# Patient Record
Sex: MALE | Race: OTHER | HISPANIC OR LATINO | URBAN - METROPOLITAN AREA
[De-identification: names, ages, dates, MRNs, and addresses within clinical notes are randomized per-mention and may not be internally consistent; named-entity substitution may affect disease eponyms.]

---

## 2022-08-23 ENCOUNTER — EMERGENCY (EMERGENCY)
Facility: HOSPITAL | Age: 22
LOS: 1 days | Discharge: HOME | End: 2022-08-23
Attending: EMERGENCY MEDICINE
Payer: MEDICAID

## 2022-08-23 VITALS
RESPIRATION RATE: 16 BRPM | TEMPERATURE: 98 F | HEIGHT: 66 IN | HEART RATE: 69 BPM | DIASTOLIC BLOOD PRESSURE: 82 MMHG | OXYGEN SATURATION: 99 % | WEIGHT: 134.92 LBS | SYSTOLIC BLOOD PRESSURE: 127 MMHG

## 2022-08-23 PROCEDURE — 99283 EMERGENCY DEPT VISIT LOW MDM: CPT | Mod: 25

## 2022-08-23 PROCEDURE — 73130 X-RAY EXAM OF HAND: CPT | Mod: 26,LT

## 2022-08-23 PROCEDURE — 10120 INC&RMVL FB SUBQ TISS SMPL: CPT

## 2022-08-23 PROCEDURE — 90715 TDAP VACCINE 7 YRS/> IM: CPT

## 2022-08-23 PROCEDURE — 99284 EMERGENCY DEPT VISIT MOD MDM: CPT

## 2022-08-23 PROCEDURE — 73130 X-RAY EXAM OF HAND: CPT

## 2022-08-23 RX ORDER — CEPHALEXIN 500 MG
1 CAPSULE ORAL
Qty: 28 | Refills: 0
Start: 2022-08-23 | End: 2022-08-29

## 2022-08-23 RX ORDER — TETANUS TOXOID, REDUCED DIPHTHERIA TOXOID AND ACELLULAR PERTUSSIS VACCINE, ADSORBED 5; 2.5; 8; 8; 2.5 [IU]/.5ML; [IU]/.5ML; UG/.5ML; UG/.5ML; UG/.5ML
0.5 SUSPENSION INTRAMUSCULAR ONCE
Refills: 0 | Status: COMPLETED | OUTPATIENT
Start: 2022-08-23 | End: 2022-08-23

## 2022-08-23 RX ORDER — CEPHALEXIN 500 MG
500 CAPSULE ORAL ONCE
Refills: 0 | Status: COMPLETED | OUTPATIENT
Start: 2022-08-23 | End: 2022-08-23

## 2022-08-23 RX ORDER — IBUPROFEN 200 MG
600 TABLET ORAL ONCE
Refills: 0 | Status: COMPLETED | OUTPATIENT
Start: 2022-08-23 | End: 2022-08-23

## 2022-08-23 RX ADMIN — Medication 500 MILLIGRAM(S): at 22:37

## 2022-08-23 RX ADMIN — TETANUS TOXOID, REDUCED DIPHTHERIA TOXOID AND ACELLULAR PERTUSSIS VACCINE, ADSORBED 0.5 MILLILITER(S): 5; 2.5; 8; 8; 2.5 SUSPENSION INTRAMUSCULAR at 21:17

## 2022-08-23 RX ADMIN — Medication 600 MILLIGRAM(S): at 21:46

## 2022-08-23 RX ADMIN — Medication 600 MILLIGRAM(S): at 21:16

## 2022-08-23 NOTE — ED PROVIDER NOTE - NSFOLLOWUPINSTRUCTIONS_ED_ALL_ED_FT
Please follow up with hand surgery within 3 days     Return to the ER if pain to the site, swelling to the site, redness around site, discharge from site, fever, chills, or any new concerns

## 2022-08-23 NOTE — ED PROVIDER NOTE - CARE PROVIDER_API CALL
Mercedes Garner)  Orthopedics  [address] [place] [address], [address]  [place] [address]  Phone: [phone]  Fax: [phone]  Follow Up Time:

## 2022-08-23 NOTE — ED PROVIDER NOTE - OBJECTIVE STATEMENT
22 y/o male presents c/o betty thorn to his left 3rd digit. He tried to remove the thorn at home however it broke off and is not under his skin. Last Tetanus > 10 years ago.

## 2022-08-23 NOTE — ED PROVIDER NOTE - CLINICAL SUMMARY MEDICAL DECISION MAKING FREE TEXT BOX
Left 3rd digit FB,   thorn was removed, PT counseled on possible remaining FB in soft tissue and need for follow up   tetanus, abx, follow up hand

## 2022-08-23 NOTE — ED PROVIDER NOTE - NS ED ATTENDING STATEMENT MOD
This was a shared visit with the GUERRERO. I reviewed and verified the documentation and independently performed the documented:

## 2022-08-23 NOTE — ED PROVIDER NOTE - ATTENDING APP SHARED VISIT CONTRIBUTION OF CARE
21yoM p/w thorn to left 3rd digit.  reports attempting to remove thorn but piece broke off. now c/o pain over area.  last tetanus unknown.  EXAM:  L HAND:  3rd digit: puncture wound to dorsum of proximal phalynx, from @ digit  A/P:  21yoM p/w left 3rd digit with fb  -xray, fb removal, adacel, abx, f/up with hand

## 2022-08-23 NOTE — ED ADULT NURSE NOTE - OBJECTIVE STATEMENT
c/o splinter. Pt stated he has a splinter in his finger he was unable to get out at home Denies fevers, chills, N/V/D, CP, SOB. Pt AOx4, respirations even and unlabored on RA, skin warm and dry. Pt brought to XR at this time.

## 2022-08-23 NOTE — ED PROVIDER NOTE - PHYSICAL EXAMINATION
Left 3rd digit: + puncture wound noted to dorsum of proximal digit, + tenderness surrounding the site, no erythema, no discharge, + FROM, tendons intact, NVI

## 2022-08-23 NOTE — ED ADULT NURSE NOTE - CAS ELECT INFOMATION PROVIDED
Pt discharged with  benson and ALBARO louise at bedside, all questions anseered by ALBARO louise, pharmacy verified with pt.

## 2022-08-23 NOTE — ED PROVIDER NOTE - PATIENT PORTAL LINK FT
You can access the FollowMyHealth Patient Portal offered by [facility] by registering at the following website: [email or web address]. By joining [facility]’s FollowMyHealth portal, you will also be able to view your health information using other applications (apps) compatible with our system.

## 2022-09-01 NOTE — ED PROCEDURE NOTE - PROCEDURE ADDITIONAL DETAILS
Local aesthesia obtained with 1 % lidocaine, gently using a clamp a betty thorn was removed for the subcutaneous tissue, bacitracin and bandage placed.

## 2023-05-11 NOTE — ED PROVIDER NOTE - TEMPLATE, MLM
[Respiratory Effort] : normal respiratory effort [Normal Rate and Rhythm] : normal rate and rhythm [de-identified] : right groin incision healing well.  firmness around incision with swelling.  no hernia recurrence appreciated General

## 2024-02-19 NOTE — ED ADULT NURSE NOTE - NSIMPLEMENTINTERV_GEN_ALL_ED
Implemented All Universal Safety Interventions:  [place] to call system. Call bell, personal items and telephone within reach. Instruct patient to call for assistance. Room bathroom lighting operational. Non-slip footwear when patient is off stretcher. Physically safe environment: no spills, clutter or unnecessary equipment. Stretcher in lowest position, wheels locked, appropriate side rails in place.
1 person assist

## 2025-06-05 NOTE — ED ADULT NURSE NOTE - NSFALLRSKUNASSIST_ED_ALL_ED
ROOMING PROGRESS NOTE    Patient: Sudhakar Mayers here for a visit with Dr. Deon Freedman  PCP: Camryn Monzon MD    Patient placed in Room: 6    Do you have any questions/concerns since your last visit?    Yes  Lower back and hip pain    Did the patient bring a friend or family member with them into the room?  Yes, Wife  If so, did the patient give explicit permission for the practitioner to discuss their healthcare in front of that friend/family member?   Yes    5. Medication list reviewed and updated by writer.   Are there any refills needed today?  No   no